# Patient Record
Sex: MALE | Race: WHITE | HISPANIC OR LATINO | URBAN - METROPOLITAN AREA
[De-identification: names, ages, dates, MRNs, and addresses within clinical notes are randomized per-mention and may not be internally consistent; named-entity substitution may affect disease eponyms.]

---

## 2020-09-16 ENCOUNTER — OFFICE VISIT (OUTPATIENT)
Dept: URBAN - METROPOLITAN AREA CLINIC 57 | Facility: CLINIC | Age: 50
End: 2020-09-16

## 2020-09-23 ENCOUNTER — OFFICE VISIT (OUTPATIENT)
Dept: URBAN - METROPOLITAN AREA CLINIC 57 | Facility: CLINIC | Age: 50
End: 2020-09-23

## 2020-10-19 ENCOUNTER — OFFICE VISIT (OUTPATIENT)
Dept: URBAN - METROPOLITAN AREA MEDICAL CENTER 7 | Facility: MEDICAL CENTER | Age: 50
End: 2020-10-19

## 2020-11-04 ENCOUNTER — OFFICE VISIT (OUTPATIENT)
Dept: URBAN - METROPOLITAN AREA CLINIC 57 | Facility: CLINIC | Age: 50
End: 2020-11-04

## 2022-07-09 ENCOUNTER — TELEPHONE ENCOUNTER (OUTPATIENT)
Dept: URBAN - METROPOLITAN AREA CLINIC 121 | Facility: CLINIC | Age: 52
End: 2022-07-09

## 2022-07-10 ENCOUNTER — TELEPHONE ENCOUNTER (OUTPATIENT)
Dept: URBAN - METROPOLITAN AREA CLINIC 121 | Facility: CLINIC | Age: 52
End: 2022-07-10

## 2022-07-10 RX ORDER — OMEGA-3S/DHA/EPA/FISH OIL 980-1400MG
CAPSULE,DELAYED RELEASE (ENTERIC COATED) ORAL
Refills: 0 | Status: ACTIVE | COMMUNITY
Start: 2020-09-16

## 2022-07-10 RX ORDER — WHEAT DEXTRIN 3 G/4 G
USE AS DIRECTED - MIX 1 TBSP PER DAY IN LIQUID POWDER (GRAM) ORAL
Refills: 6 | Status: ACTIVE | COMMUNITY
Start: 2020-11-04

## 2022-07-10 RX ORDER — AZELASTINE HYDROCHLORIDE 137 UG/1
SPRAY, METERED NASAL
Refills: 0 | Status: ACTIVE | COMMUNITY
Start: 2020-09-16

## 2023-04-27 ENCOUNTER — TELEPHONE ENCOUNTER (OUTPATIENT)
Dept: URBAN - METROPOLITAN AREA CLINIC 63 | Facility: CLINIC | Age: 53
End: 2023-04-27

## 2023-04-27 ENCOUNTER — P2P PATIENT RECORD (OUTPATIENT)
Age: 53
End: 2023-04-27

## 2023-05-03 ENCOUNTER — LAB OUTSIDE AN ENCOUNTER (OUTPATIENT)
Dept: URBAN - METROPOLITAN AREA CLINIC 57 | Facility: CLINIC | Age: 53
End: 2023-05-03

## 2023-05-03 ENCOUNTER — DASHBOARD ENCOUNTERS (OUTPATIENT)
Age: 53
End: 2023-05-03

## 2023-05-03 ENCOUNTER — OFFICE VISIT (OUTPATIENT)
Dept: URBAN - METROPOLITAN AREA CLINIC 57 | Facility: CLINIC | Age: 53
End: 2023-05-03
Payer: COMMERCIAL

## 2023-05-03 ENCOUNTER — WEB ENCOUNTER (OUTPATIENT)
Dept: URBAN - METROPOLITAN AREA CLINIC 57 | Facility: CLINIC | Age: 53
End: 2023-05-03

## 2023-05-03 VITALS
BODY MASS INDEX: 27.09 KG/M2 | HEART RATE: 76 BPM | SYSTOLIC BLOOD PRESSURE: 100 MMHG | HEIGHT: 72 IN | DIASTOLIC BLOOD PRESSURE: 78 MMHG | OXYGEN SATURATION: 97 % | WEIGHT: 200 LBS

## 2023-05-03 DIAGNOSIS — R19.4 ALTERED BOWEL HABITS: ICD-10-CM

## 2023-05-03 PROCEDURE — 99204 OFFICE O/P NEW MOD 45 MIN: CPT | Performed by: PHYSICIAN ASSISTANT

## 2023-05-03 RX ORDER — AZELASTINE HYDROCHLORIDE 137 UG/1
SPRAY, METERED NASAL
Refills: 0 | Status: DISCONTINUED | COMMUNITY
Start: 2020-09-16

## 2023-05-03 RX ORDER — WHEAT DEXTRIN 3 G/4 G
USE AS DIRECTED - MIX 1 TBSP PER DAY IN LIQUID POWDER (GRAM) ORAL
Refills: 6 | Status: DISCONTINUED | COMMUNITY
Start: 2020-11-04

## 2023-05-03 RX ORDER — OMEGA-3S/DHA/EPA/FISH OIL 980-1400MG
CAPSULE,DELAYED RELEASE (ENTERIC COATED) ORAL
Refills: 0 | Status: ACTIVE | COMMUNITY
Start: 2020-09-16

## 2023-05-03 RX ORDER — SODIUM, POTASSIUM,MAG SULFATES 17.5-3.13G
177 ML AS DIRECTED SOLUTION, RECONSTITUTED, ORAL ORAL 1
Qty: 1 KIT | Refills: 0 | OUTPATIENT
Start: 2023-05-03 | End: 2023-06-02

## 2023-05-03 NOTE — HPI-TODAY'S VISIT:
Popeye is a 52 year old male who complains of "slime coming from rectum for 2 weeks" Last colonoscopy 10/19/2020: 2  polyps removed, hemorrhoids. Colonoscopy performed 10/19/2020 showed internal hemorrhoids, small in size, 2polyps: #1, 5 mm in size, located in the cecum, #2, 3 mm in size located in the ascending colon, pathology shows cecum polyp polypoid mucosa demonstrating focal serrated features, ascending colon polyp: Tubular adenoma.  CT head peformed at  on 4/27/23  Recent labs on 4/27 show normal LFTs, and H/H, elevated cholesterol. Drinks 1/2 liter of green tea "Marquis" in the morning. Reports getting plenty of fruits and vegetables. Drinks fruit smoothie in the morning. Does not take a fiber supplement. Takes supplements of magnesium, 2 pills at night, and Potassium 1 pill in the am and 1 pill in the afternoon. Multivitamin once daily. Does not drink much coffee, 1-2 cups per week. Had CT head to check for sinus infection and his wife noticed he is breathing through the mouth. Denies any recent antibiotics. Recent travel to [facility] [place]. Has some bright red drops of blood sometimes when wiping. No pain, no abdominal pain. No fever or chills or illness. 3-4 bm in 3 hour period. No nocturnal symptoms. Had history of kidney stones 2-3 years ago.

## 2023-05-15 ENCOUNTER — OFFICE VISIT (OUTPATIENT)
Dept: URBAN - METROPOLITAN AREA MEDICAL CENTER 7 | Facility: MEDICAL CENTER | Age: 53
End: 2023-05-15
Payer: COMMERCIAL

## 2023-05-15 DIAGNOSIS — D12.2 POLYP OF ASCENDING COLON: ICD-10-CM

## 2023-05-15 DIAGNOSIS — R19.4 ALTERED BOWEL HABITS: ICD-10-CM

## 2023-05-15 DIAGNOSIS — K64.0 GRADE I HEMORRHOIDS: ICD-10-CM

## 2023-05-15 PROCEDURE — 45380 COLONOSCOPY AND BIOPSY: CPT | Performed by: INTERNAL MEDICINE

## 2023-05-15 RX ORDER — OMEGA-3S/DHA/EPA/FISH OIL 980-1400MG
CAPSULE,DELAYED RELEASE (ENTERIC COATED) ORAL
Refills: 0 | Status: ACTIVE | COMMUNITY
Start: 2020-09-16

## 2023-05-15 RX ORDER — SODIUM, POTASSIUM,MAG SULFATES 17.5-3.13G
177 ML AS DIRECTED SOLUTION, RECONSTITUTED, ORAL ORAL 1
Qty: 1 KIT | Refills: 0 | Status: ACTIVE | COMMUNITY
Start: 2023-05-03 | End: 2023-06-02